# Patient Record
Sex: FEMALE | Race: WHITE
[De-identification: names, ages, dates, MRNs, and addresses within clinical notes are randomized per-mention and may not be internally consistent; named-entity substitution may affect disease eponyms.]

---

## 2017-03-01 ENCOUNTER — HOSPITAL ENCOUNTER (OUTPATIENT)
Dept: HOSPITAL 47 - RADUSMAIN | Age: 37
Discharge: HOME | End: 2017-03-01
Attending: FAMILY MEDICINE
Payer: COMMERCIAL

## 2017-03-01 DIAGNOSIS — R10.11: Primary | ICD-10-CM

## 2017-03-01 PROCEDURE — 76705 ECHO EXAM OF ABDOMEN: CPT

## 2017-03-01 PROCEDURE — 76770 US EXAM ABDO BACK WALL COMP: CPT

## 2017-03-01 NOTE — US
EXAMINATION TYPE: US abd limited kidneys/bladder

 

DATE OF EXAM: 3/1/2017 9:26 AM

 

COMPARISON: 4/30/2016

 

CLINICAL HISTORY: 36-year-old female R10.9 R Flank Pain, R10.11 RUQ Pain.

 

TECHNIQUE: Multiple sonographic images of the right upper quadrant, kidneys and bladder were obtained
.

 

FINDINGS:

Liver Length:  16.1 cm   

Gallbladder Wall:  0.2 cm   

CBD:  0.3 cm

Right Kidney:  11.4 x 6.5 x 6.3  cm 

Left Kidney:  10.6 x 5.5 x 5.5 cm

 

Pancreas:  Tail obscured by overlying bowel gas. Otherwise, grossly unremarkable. 

Liver: Coarsened echotexture may be on a technical basis. No focal lesion seen.

Gallbladder: No abnormal gallbladder distention, wall thickening, pericholecystic fluid, or shadowing
 calculi.

CBD:  Within normal limits.

Right Kidney:  No hydronephrosis

Left Kidney:  No hydronephrosis

Bladder: No gross abnormality of the urine distended bladder. Both ureteral jets are visualized.

 

 

IMPRESSION:

1. Coarsened echotexture of the liver could be on a technical basis or could represent underlying non
specific hepatocellular disease. Correlate with LFTs in patient risk factors.

2. No hydronephrosis. Both ureteral jets are seen.

## 2017-10-14 ENCOUNTER — HOSPITAL ENCOUNTER (OUTPATIENT)
Dept: HOSPITAL 47 - LABPAT | Age: 37
Discharge: HOME | End: 2017-10-14
Payer: COMMERCIAL

## 2017-10-14 DIAGNOSIS — Z01.812: Primary | ICD-10-CM

## 2017-10-14 LAB
ANION GAP SERPL CALC-SCNC: 7 MMOL/L
BUN SERPL-SCNC: 5 MG/DL (ref 7–17)
CALCIUM SPEC-MCNC: 8.6 MG/DL (ref 8.4–10.2)
CELLS COUNTED: 100
CH: 32.6
CHCM: 32.9
CHLORIDE SERPL-SCNC: 105 MMOL/L (ref 98–107)
CO2 SERPL-SCNC: 24 MMOL/L (ref 22–30)
ERYTHROCYTE [DISTWIDTH] IN BLOOD BY AUTOMATED COUNT: 4.36 M/UL (ref 3.8–5.4)
ERYTHROCYTE [DISTWIDTH] IN BLOOD: 13.4 % (ref 11.5–15.5)
GLUCOSE SERPL-MCNC: 88 MG/DL (ref 74–99)
HCT VFR BLD AUTO: 43.5 % (ref 34–46)
HDW: 2.28
HGB BLD-MCNC: 13.9 GM/DL (ref 11.4–16)
MCH RBC QN AUTO: 32 PG (ref 25–35)
MCHC RBC AUTO-ENTMCNC: 32.1 G/DL (ref 31–37)
MCV RBC AUTO: 99.8 FL (ref 80–100)
NON-AFRICAN AMERICAN GFR(MDRD): >60
POTASSIUM SERPL-SCNC: 3.9 MMOL/L (ref 3.5–5.1)
SODIUM SERPL-SCNC: 136 MMOL/L (ref 137–145)
WBC # BLD AUTO: 8.4 K/UL (ref 3.8–10.6)
WBC (PEROX): 8.87

## 2017-10-14 PROCEDURE — 80048 BASIC METABOLIC PNL TOTAL CA: CPT

## 2017-10-14 PROCEDURE — 85025 COMPLETE CBC W/AUTO DIFF WBC: CPT

## 2017-10-14 PROCEDURE — 86850 RBC ANTIBODY SCREEN: CPT

## 2017-10-14 PROCEDURE — 86900 BLOOD TYPING SEROLOGIC ABO: CPT

## 2017-10-14 PROCEDURE — 86901 BLOOD TYPING SEROLOGIC RH(D): CPT

## 2018-01-16 ENCOUNTER — HOSPITAL ENCOUNTER (OUTPATIENT)
Dept: HOSPITAL 47 - RADMAMWWP | Age: 38
Discharge: HOME | End: 2018-01-16
Attending: FAMILY MEDICINE
Payer: COMMERCIAL

## 2018-01-16 DIAGNOSIS — R92.8: ICD-10-CM

## 2018-01-16 DIAGNOSIS — N63.10: Primary | ICD-10-CM

## 2018-01-16 PROCEDURE — 77066 DX MAMMO INCL CAD BI: CPT

## 2018-01-17 NOTE — USB
Reason for exam: additional evaluation requested from abnormal screening.



History:

Patient is nulliparous.

Taking hormonal contraceptives for 4 years.



US Breast Limited LT

Left breast ultrasound demonstrates no cystic or solid lesion seen. A 6 month 

follow up left mammogram recommended.



These results were verbally communicated with the patient and result sheet given 

to the patient on 1/16/18.





ASSESSMENT: Probably benign, BI-RAD 3



RECOMMENDATION:

Follow-up diagnostic mammogram of the left breast in 6 months.

## 2018-01-17 NOTE — MM
Reason for exam: clinical finding.

Last mammogram was performed 3 years and 3 months ago.



History:

Patient is nulliparous.

Taking hormonal contraceptives for 4 years.

Indicated problem(s): lump or thickening in the right breast.



Physical Findings:

Nurse did not find any significant physical abnormalities on exam.



MG Diagnostic Mammo w CAD [facility]

Bilateral CC and MLO view(s) were taken.  ML and spot compression MLO view(s) were

taken of the left breast.

Prior study comparison: October 24, 2014, bilateral MG screening mammo w CAD.

The breast tissue is heterogeneously dense. This may lower the sensitivity of 

mammography.  Nodular density upper outer quadrant 8.7cm from nipple noted. 

Ultrasound is recommended.



These results were verbally communicated with the patient and result sheet given 

to the patient on 1/16/18.





ASSESSMENT: Incomplete: need additional imaging evaluation, BI-RAD 0



RECOMMENDATION:

Ultrasound of the left breast.

## 2018-04-20 ENCOUNTER — HOSPITAL ENCOUNTER (OUTPATIENT)
Dept: HOSPITAL 47 - RADUSWWP | Age: 38
End: 2018-04-20
Payer: COMMERCIAL

## 2018-04-20 DIAGNOSIS — N83.291: Primary | ICD-10-CM

## 2018-04-20 PROCEDURE — 76856 US EXAM PELVIC COMPLETE: CPT

## 2018-04-20 NOTE — US
EXAMINATION TYPE: US pelvic complete

 

DATE OF EXAM: 4/20/2018

 

COMPARISON: US December 13, 2015 CLINICAL HISTORY: R10.2 pelvic pain. Intermittent pelvic pain x coup
le weeks, occasional bleeding since hysterectomy 6 months ago

 

TECHNIQUE:  Transabdominal (TA).  Transabdominal sonographic images of the pelvis were acquired.  

 

Date of LMP:  2017

 

EXAM MEASUREMENTS:

 

Uterus:  surgically absent 

Endometrial Stripe: surgically absent 

Right Ovary:  3.1 x 2.5 x 2.9 cm

Left Ovary:  3.0 x 1.5 x 2.7 cm

 

 

 

1. Uterus:  surgically absent

2. Endometrium:  surgically absent

3. Right Ovary:  1.4 x 1.4 x 1.5cm hypoechoic area

4. Left Ovary:  wnl

5. Bilateral Adnexa:  wnl

6. Posterior cul-de-sac:  wnl

 

There has been interval hysterectomy. There is 1.5 cm round anechoic lesion within right ovary likely
 reflecting simple small ovarian cyst

 

IMPRESSION: A 1.5 cm simple small right ovarian cyst is present otherwise unremarkable study.

## 2018-09-25 ENCOUNTER — HOSPITAL ENCOUNTER (OUTPATIENT)
Dept: HOSPITAL 47 - RADMAMWWP | Age: 38
Discharge: HOME | End: 2018-09-25
Attending: FAMILY MEDICINE
Payer: COMMERCIAL

## 2018-09-25 DIAGNOSIS — N63.21: Primary | ICD-10-CM

## 2018-09-25 PROCEDURE — 77065 DX MAMMO INCL CAD UNI: CPT

## 2018-09-26 NOTE — MM
Reason for exam: follow-up at short interval from prior study.

Last mammogram was performed 8 months ago.



History:

Patient is nulliparous.

Taking hormonal contraceptives for 4 years.



Physical Findings:

Nurse did not find any significant physical abnormalities on exam.



MG Diagnostic Mammo LT w CAD

CC and MLO view(s) were taken of the left breast.

Prior study comparison: January 16, 2018, bilateral MG diagnostic mammo w CAD [facility].

October 24, 2014, bilateral MG screening mammo w CAD.

The breast tissue is heterogeneously dense. This may lower the sensitivity of 

mammography.  There are benign appearing round calcifications in the left breast. 

Asymmetric breast tissue, stable, left posterior upper tissue. There is no 

discrete abnormality.



These results were verbally communicated with the patient and result sheet given 

to the patient on 9/25/18.





ASSESSMENT: Benign, BI-RAD 2



RECOMMENDATION:

Routine screening mammogram of both breasts in 4 months.

Back on schedule for January 2019.

## 2018-11-11 ENCOUNTER — HOSPITAL ENCOUNTER (EMERGENCY)
Dept: HOSPITAL 47 - EC | Age: 38
Discharge: HOME | End: 2018-11-11
Payer: COMMERCIAL

## 2018-11-11 VITALS
DIASTOLIC BLOOD PRESSURE: 77 MMHG | TEMPERATURE: 98.2 F | SYSTOLIC BLOOD PRESSURE: 132 MMHG | RESPIRATION RATE: 16 BRPM | HEART RATE: 74 BPM
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
## 2018-11-11 DIAGNOSIS — Z88.1: ICD-10-CM
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
## 2018-11-11 DIAGNOSIS — S90.31XA: Primary | ICD-10-CM

## 2018-11-11 DIAGNOSIS — Z79.899: ICD-10-CM

## 2018-11-11 PROCEDURE — 96372 THER/PROPH/DIAG INJ SC/IM: CPT

## 2018-11-11 PROCEDURE — 73630 X-RAY EXAM OF FOOT: CPT

## 2018-11-11 PROCEDURE — 99283 EMERGENCY DEPT VISIT LOW MDM: CPT

## 2018-11-11 NOTE — XR
EXAMINATION TYPE: XR foot complete RT

 

DATE OF EXAM: 11/11/2018

 

COMPARISON: NONE

 

HISTORY: Right foot pain

 

TECHNIQUE: 3 views

 

FINDINGS: Metatarsals are intact. I see no fracture nor dislocation. There is plantar calcaneal spurr
ing. There are no erosions.

 

IMPRESSION: Calcaneal spurring. No fracture.

## 2018-11-11 NOTE — ED
Lower Extremity Injury HPI





- General


Source: patient


Mode of arrival: ambulatory


Limitations: no limitations





<Nicole Barrios - Last Filed: 11/11/18 22:54>





<Edith Celaya - Last Filed: 11/12/18 00:04>





- General


Chief Complaint: Extremity Injury, Lower


Stated Complaint: FOOT INJURY


Time Seen by Provider: 11/11/18 22:07





- History of Present Illness


Initial Comments: 


39yo female with PMH of previous gastric bypass presenting today for cc of 

right foot pain. At around 9:35PM this evening pt was using laptop when it fell 

onto the top of her right foot. Pt stated that it immediately began to swell 

and bruise. pt admitted to pain with ambulation/weight bearing. Pt denies 

numbness, tingling, loss of sensation, inability to move toes, injury to ankle 

or any other extremity, pt denies falling. Upon arrival pt appears well, she is 

limping. Pt VS within acceptable limits. Remainder of ROS (-), patient denies 

any recent fever, chills, shortness of breath, chest pain, back pain, abdominal 

pain, nausea or vomiting, numbness or tingling, dysuria or hematuria, 

constipation or diarrhea, headaches or visual changes, or any other complaints.


 (Nicole Barrios)





- Related Data


 Home Medications











 Medication  Instructions  Recorded  Confirmed


 


Multivitamins, Thera [Multivitamin] 1 tab PO DAILY 12/30/14 10/24/17


 


ALPRAZolam [Xanax] 0.25 mg PO DAILY PRN 10/19/17 10/24/17


 


Biotin 5,000 mcg PO DAILY 10/19/17 10/24/17


 


Cholecalciferol (Vitamin D3) 2,000 unit PO DAILY 10/19/17 10/24/17





[Vitamin D3]   


 


Melatonin 5 mg PO HS 10/19/17 10/24/17


 


Vitamin A (Unknown Dose) 1 tab PO DAILY 10/19/17 10/24/17








 Previous Rx's











 Medication  Instructions  Recorded


 


Acetaminophen-Codeine 300-30mg 1 tab PO Q4H PRN #30 tablet 10/24/17





[Tylenol #3]  


 


Ibuprofen [Motrin] 600 mg PO Q6HR PRN #30 tab 10/24/17











 Allergies











Allergy/AdvReac Type Severity Reaction Status Date / Time


 


clindamycin HCl Allergy  Anaphylaxis Verified 11/11/18 22:06





[From Cleocin]     


 


clindamycin palmitate HCl Allergy  Anaphylaxis Verified 11/11/18 22:06





[From Cleocin]     


 


clindamycin phosphate Allergy  Anaphylaxis Verified 11/11/18 22:06





[From Cleocin]     














Review of Systems


ROS Other: All systems not noted in ROS Statement are negative.


Constitutional: Denies: fever, chills


Eyes: Denies: eye pain


ENT: Denies: ear pain, throat pain


Respiratory: Denies: cough, dyspnea, wheezes, hemoptysis, stridor


Cardiovascular: Denies: chest pain, palpitations


Gastrointestinal: Denies: abdominal pain, nausea, vomiting, diarrhea, 

constipation, hematemesis, melena


Genitourinary: Denies: urgency, dysuria, frequency, hematuria


Musculoskeletal: Reports: other (pain lateral aspect of right foot).  Denies: 

back pain


Skin: Reports: as per HPI, change in color (bruising, denies any pallor).  

Denies: rash, lesions


Neurological: Denies: headache, weakness, numbness, paresthesias, confusion





<Nicole Barrios L - Last Filed: 11/11/18 22:54>


ROS Other: All systems not noted in ROS Statement are negative.





<Edith Celaya P - Last Filed: 11/12/18 00:04>


ROS Statement: 


Those systems with pertinent positive or pertinent negative responses have been 

documented in the HPI.








Past Medical History


Past Medical History: No Reported History


Additional Past Medical History / Comment(s): PT STATES HX OF FIBROIDS


History of Any Multi-Drug Resistant Organisms: None Reported


Past Surgical History: Bariatric Surgery, Tonsillectomy, Tubal Ligation, 

Uterine Ablation


Additional Past Surgical History / Comment(s): GASTRIC SLEEVE, BENIGN LYMPH 

NODES GROIN REMOVED, EXPLORATORY ABDOMINAL SX, D&C X2 left knee arthroscopy,


Past Anesthesia/Blood Transfusion Reactions: No Reported Reaction


Past Psychological History: Anxiety


Smoking Status: Never smoker


Past Alcohol Use History: Occasional


Past Drug Use History: None Reported





- Past Family History


  ** Mother


Family Medical History: Deep Vein Thrombosis (DVT)





<Nicole Barrios - Last Filed: 11/11/18 22:54>





General Exam


Limitations: no limitations





<Nicole Barrios - Last Filed: 11/11/18 22:54>





<Edith Celaya P - Last Filed: 11/12/18 00:04>





- General Exam Comments


Initial Comments: 


General:  The patient is awake and alert, in no distress, and does not appear 

acutely ill. 


Eye:  Pupils are equal, round and reactive to light, extra-ocular movements are 

intact.  No nystagmus.  There is normal conjunctiva bilaterally.  No signs of 

icterus.  


Ears, nose, mouth and throat:  There are moist mucous membranes and no oral 

lesions. 


Neck:  The neck is supple, there is no tenderness or JVD.  


Cardiovascular:  There is a regular rate and rhythm. No murmur, rub or gallop 

is appreciated.


Respiratory:  Lungs are clear to auscultation, respirations are non-labored, 

breath sounds are equal.  No wheezes, stridor, rales, or rhonchi.


Musculoskeletal:  Upon inspection there is bruising near base of 5th metatarsal

, no bruising or pain of the midfoot or forefoot. Pt admits to tenderness to 

palpation along length of fifth digit/metarsal. Pt is able to wiggle all five 

digits of the feet b/l equally.  Strength 5/5 at the ankle equal b/l. Sensation 

intact. DP pulses equal bilaterally 2+. Compartments are soft and compressible. 


Neurological:  A&O x 3. CN II-XII intact, There are no obvious motor or sensory 

deficits. Coordination appears grossly intact. Speech is normal.


Skin:  Skin is warm and dry and no rashes or lesions are noted. 


Psychiatric:  Cooperative, appropriate mood & affect, normal judgment.  


 (Nicole Barrios)





 Vital Signs











  11/11/18 11/11/18





  22:04 22:43


 


Temperature 98.1 F 98.2 F


 


Pulse Rate 77 74


 


Respiratory 18 16





Rate  


 


Blood Pressure 119/68 132/77


 


O2 Sat by Pulse 99 97





Oximetry  














Medical Decision Making





<Nicole Barrios L - Last Filed: 11/11/18 22:54>





<Edith Celaya P - Last Filed: 11/12/18 00:04>





- Medical Decision Making


XR (-), no concern for lisfranc at this time, no mid/forefoot pain or bruising. 

Pt neurovascularly intact, compartments soft and compressible. Pt was 

instructed to repeat XR in 5 days if symptoms persist and to ambulate using 

crutches for next 2-3 days. In addition pt was instructed to f/u with primary 

care within 48 hours. Ice was applied to the area, pt given toradol 30mg IM, 

she states she previously has tolerated reguardless of her bypass surgery. Pt 

placed in ACE bandage, pt states she has crutches for ambulation at home. Pt 

given work note for next 2 days. At this time after discussing all results with 

both patient and attending Dr. Celaya. I feel pt is stable for discharge.  To 

parameters discussed at length with patient who verbalized understanding.  All 

findings were discussed with patient from radiographic imaging.  Patient was 

discharged in stable condition, patient agreeable with discharge.


 (Nicole Barrios)


I was available for consultation in the emergency department. The history and 

physical exam were done by the midlevel provider.  I was consulted for this 

patient's care.  I reviewed the case with the midlevel provider and based on 

their presentation of the patient, I agree with the assessment, medical 

decision making and plan of care as documented.


 (Edith Celaya)





Disposition


Is patient prescribed a controlled substance at d/c from ED?: No


Time of Disposition: 22:32





<Nicole Barrios - Last Filed: 11/11/18 22:54>





<Edith Celaya - Last Filed: 11/12/18 00:04>


Clinical Impression: 


 Contusion of right foot





Disposition: HOME SELF-CARE


Condition: Good


Instructions:  Foot Contusion (ED)


Additional Instructions: 


Please use over the counter pain medication as discussed.  Please follow-up 

with family doctor in the next 2 days, if symptoms persist >5 days I would get 

repeat XRAY. Please use crutches for ambulation for next 2-3 days.  Please 

return to emergency room if the symptoms increase or worsen or for any other 

concerns.


Referrals: 


Grupo Ovalle MD [Primary Care Provider] - 1-2 days

## 2021-03-09 ENCOUNTER — HOSPITAL ENCOUNTER (OUTPATIENT)
Dept: HOSPITAL 47 - RADMAMWWP | Age: 41
End: 2021-03-09
Attending: FAMILY MEDICINE
Payer: COMMERCIAL

## 2021-03-09 DIAGNOSIS — Z12.31: Primary | ICD-10-CM

## 2021-03-09 PROCEDURE — 77067 SCR MAMMO BI INCL CAD: CPT

## 2021-03-09 PROCEDURE — 77063 BREAST TOMOSYNTHESIS BI: CPT

## 2021-03-11 NOTE — MM
Reason for exam: screening  (asymptomatic).

Last mammogram was performed 2 years and 5 months ago.



History:

Patient is nulliparous.

Took hormonal contraceptives for 4 years.



Physical Findings:

A clinical breast exam by your physician is recommended on an annual basis and 

results should be correlated with mammographic findings.



MG 3D Screening Mammo W/Cad

Bilateral CC and MLO view(s) were taken.

Prior study comparison: January 16, 2018, bilateral MG diagnostic mammo w CAD [facility].

October 24, 2014, bilateral MG screening mammo w CAD.

The breast tissue is heterogeneously dense. This may lower the sensitivity of 

mammography.  10 o'clock right focal asymmetry is more defined and incompletely 

disperses on 3D images.





ASSESSMENT: Incomplete: need additional imaging evaluation, BI-RAD 0



RECOMMENDATION:

Special view mammogram of the right breast.

(3D)



If lesion persists on supplemental views, image directed ultrasound is 

recommended.



[facility] will attempt to contact patient to return for supplemental 

views and ultrasound if indicated.

## 2021-03-19 ENCOUNTER — HOSPITAL ENCOUNTER (OUTPATIENT)
Dept: HOSPITAL 47 - RADMAMWWP | Age: 41
Discharge: HOME | End: 2021-03-19
Attending: FAMILY MEDICINE
Payer: COMMERCIAL

## 2021-03-19 DIAGNOSIS — R92.2: Primary | ICD-10-CM

## 2021-03-19 PROCEDURE — 77061 BREAST TOMOSYNTHESIS UNI: CPT

## 2021-03-19 PROCEDURE — 77065 DX MAMMO INCL CAD UNI: CPT

## 2021-03-22 NOTE — MM
Reason for exam: additional evaluation requested from abnormal screening.

Last mammogram was performed less than 1 month ago.



History:

Patient is nulliparous.

Took hormonal contraceptives for 4 years.



Physical Findings:

Nurse did not find any significant physical abnormalities on exam.



MG 3D Work Up W/Cad RT

Spot compression CC, spot compression MLO, and LM view(s) were taken of the right 

breast.

Prior study comparison: March 9, 2021, bilateral MG 3d screening mammo w/cad.  

September 25, 2018, left breast MG diagnostic mammo LT w CAD.

The breast tissue is heterogeneously dense. This may lower the sensitivity of 

mammography.  No distinct lesion persists on additional views.



These results were verbally communicated with the patient and result sheet given 

to the patient on 3/19/21.





ASSESSMENT: Benign, BI-RAD 2



RECOMMENDATION:

Return to routine screening mammogram schedule for both breasts.

## 2022-03-19 ENCOUNTER — HOSPITAL ENCOUNTER (OUTPATIENT)
Dept: HOSPITAL 47 - LABWHC1 | Age: 42
Discharge: HOME | End: 2022-03-19
Attending: FAMILY MEDICINE
Payer: COMMERCIAL

## 2022-03-19 DIAGNOSIS — Z13.1: ICD-10-CM

## 2022-03-19 DIAGNOSIS — R53.83: ICD-10-CM

## 2022-03-19 DIAGNOSIS — Z13.220: Primary | ICD-10-CM

## 2022-03-19 LAB
ALBUMIN SERPL-MCNC: 4.1 G/DL (ref 3.8–4.9)
ALBUMIN/GLOB SERPL: 1.72 G/DL (ref 1.6–3.17)
ALP SERPL-CCNC: 106 U/L (ref 41–126)
ALT SERPL-CCNC: 14 U/L (ref 8–44)
ANION GAP SERPL CALC-SCNC: 9.8 MMOL/L (ref 10–18)
AST SERPL-CCNC: 14 U/L (ref 13–35)
BASOPHILS # BLD AUTO: 0.06 X 10*3/UL (ref 0–0.1)
BASOPHILS NFR BLD AUTO: 0.8 %
BUN SERPL-SCNC: 6 MG/DL (ref 9–27)
BUN/CREAT SERPL: 9.44 RATIO (ref 12–20)
CALCIUM SPEC-MCNC: 9 MG/DL (ref 8.7–10.3)
CHLORIDE SERPL-SCNC: 104 MMOL/L (ref 96–109)
CHOLEST SERPL-MCNC: 188 MG/DL (ref 0–200)
CO2 SERPL-SCNC: 23.5 MMOL/L (ref 20–27.5)
EOSINOPHIL # BLD AUTO: 0.45 X 10*3/UL (ref 0.04–0.35)
EOSINOPHIL NFR BLD AUTO: 6.2 %
ERYTHROCYTE [DISTWIDTH] IN BLOOD BY AUTOMATED COUNT: 4.42 X 10*6/UL (ref 4.1–5.2)
ERYTHROCYTE [DISTWIDTH] IN BLOOD: 12.5 % (ref 11.5–14.5)
GLOBULIN SER CALC-MCNC: 2.4 G/DL (ref 1.6–3.3)
GLUCOSE SERPL-MCNC: 95 MG/DL (ref 70–110)
HCT VFR BLD AUTO: 43.7 % (ref 37.2–46.3)
HDLC SERPL-MCNC: 39.6 MG/DL (ref 40–60)
HGB BLD-MCNC: 14.1 G/DL (ref 12–15)
IMM GRANULOCYTES BLD QL AUTO: 0.1 %
LDLC SERPL CALC-MCNC: 119.2 MG/DL (ref 0–131)
LYMPHOCYTES # SPEC AUTO: 2.62 X 10*3/UL (ref 0.9–5)
LYMPHOCYTES NFR SPEC AUTO: 36.2 %
MCH RBC QN AUTO: 31.9 PG (ref 27–32)
MCHC RBC AUTO-ENTMCNC: 32.3 G/DL (ref 32–37)
MCV RBC AUTO: 98.9 FL (ref 80–97)
MONOCYTES # BLD AUTO: 0.45 X 10*3/UL (ref 0.2–1)
MONOCYTES NFR BLD AUTO: 6.2 %
NEUTROPHILS # BLD AUTO: 3.64 X 10*3/UL (ref 1.8–7.7)
NEUTROPHILS NFR BLD AUTO: 50.5 %
NRBC BLD AUTO-RTO: 0 /100 WBCS (ref 0–0)
PLATELET # BLD AUTO: 256 X 10*3/UL (ref 140–440)
POTASSIUM SERPL-SCNC: 4.1 MMOL/L (ref 3.5–5.5)
PROT SERPL-MCNC: 6.5 G/DL (ref 6.2–8.2)
SODIUM SERPL-SCNC: 137 MMOL/L (ref 135–145)
T4 FREE SERPL-MCNC: 1.13 NG/DL (ref 0.8–1.8)
TRIGL SERPL-MCNC: 146 MG/DL (ref 0–149)
VLDLC SERPL CALC-MCNC: 29.2 MG/DL (ref 5–40)
WBC # BLD AUTO: 7.23 X 10*3/UL (ref 4.5–10)

## 2022-03-19 PROCEDURE — 82306 VITAMIN D 25 HYDROXY: CPT

## 2022-03-19 PROCEDURE — 84439 ASSAY OF FREE THYROXINE: CPT

## 2022-03-19 PROCEDURE — 84443 ASSAY THYROID STIM HORMONE: CPT

## 2022-03-19 PROCEDURE — 85025 COMPLETE CBC W/AUTO DIFF WBC: CPT

## 2022-03-19 PROCEDURE — 80053 COMPREHEN METABOLIC PANEL: CPT

## 2022-03-19 PROCEDURE — 84481 FREE ASSAY (FT-3): CPT

## 2022-03-19 PROCEDURE — 84630 ASSAY OF ZINC: CPT

## 2022-03-19 PROCEDURE — 36415 COLL VENOUS BLD VENIPUNCTURE: CPT

## 2022-03-19 PROCEDURE — 80061 LIPID PANEL: CPT

## 2022-03-19 PROCEDURE — 86140 C-REACTIVE PROTEIN: CPT

## 2022-09-21 ENCOUNTER — HOSPITAL ENCOUNTER (OUTPATIENT)
Dept: HOSPITAL 47 - RADMAMWWP | Age: 42
Discharge: HOME | End: 2022-09-21
Attending: FAMILY MEDICINE
Payer: COMMERCIAL

## 2022-09-21 DIAGNOSIS — Z12.31: Primary | ICD-10-CM

## 2022-09-21 PROCEDURE — 77063 BREAST TOMOSYNTHESIS BI: CPT

## 2022-09-21 PROCEDURE — 77067 SCR MAMMO BI INCL CAD: CPT

## 2022-09-22 NOTE — MM
Reason for Exam: Screening  (asymptomatic). 

Last mammogram was performed 1 year(s) and 6 month(s) ago. 





Patient History: 

Menarche at age 11. Patient has no children. Hysterectomy at age 37. Patient used Hormonal

Contraceptives for 4 years. 





Risk Values: 

Milagros 5 year model risk: 0.9%.

NCI Lifetime model risk: 10.7%.





Prior Study Comparison: 

9/25/2018 Left Diagnostic Mammogram, [facility]. 3/9/2021 Bilateral Screening Mammogram, [facility]. 3/19/2021

Right Diagnostic Mammogram, [facility]. 





Tissue Density: 

The breast tissue is heterogeneously dense. This may lower the sensitivity of mammography.





Findings: 

Analyzed By CAD. 

There is no suspicious group of microcalcifications or new suspicious mass in either breast. 





Overall Assessment: Negative, BI-RAD 1





Management: 

Screening Mammogram of both breasts in 1 year.

A clinical breast exam by your physician is recommended on an annual basis and results should be

correlated with mammographic findings.  [facility] will attempt to contact patient to

return for supplemental views and ultrasound if indicated.



Electronically signed and approved by: Deon Perrin DO

## 2023-05-20 ENCOUNTER — HOSPITAL ENCOUNTER (OUTPATIENT)
Dept: HOSPITAL 47 - LABWHC1 | Age: 43
Discharge: HOME | End: 2023-05-20
Attending: FAMILY MEDICINE
Payer: COMMERCIAL

## 2023-05-20 DIAGNOSIS — Z13.220: ICD-10-CM

## 2023-05-20 DIAGNOSIS — R53.83: ICD-10-CM

## 2023-05-20 DIAGNOSIS — Z13.1: Primary | ICD-10-CM

## 2023-05-20 PROCEDURE — 86140 C-REACTIVE PROTEIN: CPT

## 2023-05-20 PROCEDURE — 84443 ASSAY THYROID STIM HORMONE: CPT

## 2023-05-20 PROCEDURE — 80053 COMPREHEN METABOLIC PANEL: CPT

## 2023-05-20 PROCEDURE — 36415 COLL VENOUS BLD VENIPUNCTURE: CPT

## 2023-05-20 PROCEDURE — 84481 FREE ASSAY (FT-3): CPT

## 2023-05-20 PROCEDURE — 80061 LIPID PANEL: CPT

## 2023-05-20 PROCEDURE — 83036 HEMOGLOBIN GLYCOSYLATED A1C: CPT

## 2023-05-20 PROCEDURE — 82306 VITAMIN D 25 HYDROXY: CPT

## 2023-05-20 PROCEDURE — 85025 COMPLETE CBC W/AUTO DIFF WBC: CPT

## 2023-05-20 PROCEDURE — 84439 ASSAY OF FREE THYROXINE: CPT

## 2023-05-21 LAB
ALBUMIN SERPL-MCNC: 3.8 G/DL (ref 3.8–4.9)
ALBUMIN/GLOB SERPL: 1.68 G/DL (ref 1.6–3.17)
ALP SERPL-CCNC: 99 U/L (ref 41–126)
ALT SERPL-CCNC: 13 U/L (ref 8–44)
ANION GAP SERPL CALC-SCNC: 10.4 MMOL/L (ref 10–18)
AST SERPL-CCNC: 15 U/L (ref 13–35)
BASOPHILS # BLD AUTO: 0.04 X 10*3/UL (ref 0–0.1)
BASOPHILS NFR BLD AUTO: 0.6 %
BUN SERPL-SCNC: 8 MG/DL (ref 9–27)
BUN/CREAT SERPL: 12.22 RATIO (ref 12–20)
CALCIUM SPEC-MCNC: 9.2 MG/DL (ref 8.7–10.3)
CHLORIDE SERPL-SCNC: 106 MMOL/L (ref 96–109)
CHOLEST SERPL-MCNC: 201 MG/DL (ref 0–200)
CO2 SERPL-SCNC: 24.3 MMOL/L (ref 20–27.5)
EOSINOPHIL # BLD AUTO: 0.27 X 10*3/UL (ref 0.04–0.35)
EOSINOPHIL NFR BLD AUTO: 4.4 %
ERYTHROCYTE [DISTWIDTH] IN BLOOD BY AUTOMATED COUNT: 4.37 X 10*6/UL (ref 4.1–5.2)
ERYTHROCYTE [DISTWIDTH] IN BLOOD: 12.3 % (ref 11.5–14.5)
GLOBULIN SER CALC-MCNC: 2.3 G/DL (ref 1.6–3.3)
GLUCOSE SERPL-MCNC: 99 MG/DL (ref 70–110)
HCT VFR BLD AUTO: 43.3 % (ref 37.2–46.3)
HDLC SERPL-MCNC: 40.6 MG/DL (ref 40–60)
HGB BLD-MCNC: 14 G/DL (ref 12–15)
IMM GRANULOCYTES BLD QL AUTO: 0.2 %
LDLC SERPL CALC-MCNC: 129.8 MG/DL (ref 0–131)
LYMPHOCYTES # SPEC AUTO: 2.25 X 10*3/UL (ref 0.9–5)
LYMPHOCYTES NFR SPEC AUTO: 36.3 %
MCH RBC QN AUTO: 32 PG (ref 27–32)
MCHC RBC AUTO-ENTMCNC: 32.3 G/DL (ref 32–37)
MCV RBC AUTO: 99.1 FL (ref 80–97)
MONOCYTES # BLD AUTO: 0.44 X 10*3/UL (ref 0.2–1)
MONOCYTES NFR BLD AUTO: 7.1 %
NEUTROPHILS # BLD AUTO: 3.18 X 10*3/UL (ref 1.8–7.7)
NEUTROPHILS NFR BLD AUTO: 51.4 %
NRBC BLD AUTO-RTO: 0 /100 WBCS (ref 0–0)
PLATELET # BLD AUTO: 281 X 10*3/UL (ref 140–440)
POTASSIUM SERPL-SCNC: 4.4 MMOL/L (ref 3.5–5.5)
PROT SERPL-MCNC: 6.1 G/DL (ref 6.2–8.2)
SODIUM SERPL-SCNC: 141 MMOL/L (ref 135–145)
T4 FREE SERPL-MCNC: 1.26 NG/DL (ref 0.8–1.8)
TRIGL SERPL-MCNC: 153 MG/DL (ref 0–149)
VLDLC SERPL CALC-MCNC: 30.6 MG/DL (ref 5–40)
WBC # BLD AUTO: 6.19 X 10*3/UL (ref 4.5–10)

## 2023-10-04 ENCOUNTER — HOSPITAL ENCOUNTER (OUTPATIENT)
Dept: HOSPITAL 47 - RADMAMWWP | Age: 43
Discharge: HOME | End: 2023-10-04
Attending: FAMILY MEDICINE
Payer: COMMERCIAL

## 2023-10-04 DIAGNOSIS — Z12.31: Primary | ICD-10-CM

## 2023-10-04 PROCEDURE — 77067 SCR MAMMO BI INCL CAD: CPT

## 2023-10-04 PROCEDURE — 77063 BREAST TOMOSYNTHESIS BI: CPT

## 2023-10-05 NOTE — MM
Reason for Exam: Screening  (asymptomatic). 

Last mammogram was performed 1 year(s) and 1 month(s) ago. 





Patient History: 

Menarche at age 11. Patient has no children. Hysterectomy at age 37. Patient used Hormonal

Contraceptives for 4 years. 





Risk Values: 

Milagros 5 year model risk: 0.9%.

NCI Lifetime model risk: 10.6%.





Prior Study Comparison: 

3/9/2021 Bilateral Screening Mammogram, [facility]. 3/19/2021 Right Diagnostic Mammogram, [facility]. 9/21/2022

Bilateral MG 3D screening mammo w/cad, [facility]. 





Tissue Density: 

The breast tissue is heterogeneously dense. This may lower the sensitivity of mammography.





Findings: 

Analyzed By CAD. 

There is no suspicious group of microcalcifications or new suspicious mass in either breast. 





Overall Assessment: Negative, BI-RAD 1





Management: 

Screening Mammogram of both breasts in 1 year.

.



Patient should continue monthly self-breast exams.  A clinical breast exam by your physician is

recommended on an annual basis.

This exam should not preclude additional follow-up of suspicious palpable abnormalities.



Note on Milagros scores and lifetime risk:

1. A Milagros score greater than 3% is considered moderate risk. If this is the case, consider

specialist referral to assess eligibility for a risk reducing agent.

2. If overall lifetime risk for the development of breast cancer is 20% or higher, the patient may

qualify for future screening with alternating mammogram and breast MRI.



Electronically signed and approved by: Leopold M. Fregoli, M.D. Radiologis

## 2025-03-07 ENCOUNTER — HOSPITAL ENCOUNTER (OUTPATIENT)
Dept: HOSPITAL 47 - ORWHC2ENDO | Age: 45
Discharge: HOME | End: 2025-03-07
Attending: INTERNAL MEDICINE
Payer: COMMERCIAL

## 2025-03-07 VITALS — RESPIRATION RATE: 16 BRPM | TEMPERATURE: 97.7 F

## 2025-03-07 VITALS — BODY MASS INDEX: 40.3 KG/M2

## 2025-03-07 VITALS — DIASTOLIC BLOOD PRESSURE: 63 MMHG | HEART RATE: 70 BPM | SYSTOLIC BLOOD PRESSURE: 110 MMHG

## 2025-03-07 DIAGNOSIS — Z90.710: ICD-10-CM

## 2025-03-07 DIAGNOSIS — Z98.890: ICD-10-CM

## 2025-03-07 DIAGNOSIS — Z12.11: Primary | ICD-10-CM

## 2025-03-07 DIAGNOSIS — F41.9: ICD-10-CM

## 2025-03-07 DIAGNOSIS — Z88.1: ICD-10-CM

## 2025-03-07 DIAGNOSIS — F32.A: ICD-10-CM

## 2025-03-07 DIAGNOSIS — Z98.84: ICD-10-CM

## 2025-03-07 PROCEDURE — 45378 DIAGNOSTIC COLONOSCOPY: CPT

## 2025-03-07 RX ADMIN — POTASSIUM CHLORIDE ONE MLS: 14.9 INJECTION, SOLUTION INTRAVENOUS at 13:38

## 2025-03-07 NOTE — P.PCN
Date of Procedure: 03/07/25


Procedure(s) Performed: 


BRIEF HISTORY: Patient is a 44-year-old pleasant white female scheduled for an 

elective colonoscopy as a part of screening for cancer.





PROCEDURE PERFORMED: Colonoscopy. 





PREOPERATIVE DIAGNOSIS: Screening for colon cancer. 





IV sedation per Anesthesia. 





PROCEDURE: After informed consent was obtained, the patient, was brought into 

the endoscopy unit. IV sedation was administered by Anesthesia under continuous 

monitoring.  Digital rectal examination was normal. Initially the Olympus CF-160

flexible video colonoscope was then inserted in the rectum, gradually advanced 

into the cecum without any difficulty. Careful examination was performed as the 

scope was gradually being withdrawn. Ileocecal valve and the appendiceal orifice

were visualized and appeared normal.  Prep was excellent. Mucosa of the cecum, 

ascending colon, transverse colon, descending colon, sigmoid colon, and rectum 

appeared normal. Retroflexion was performed in the rectum and no lesions were 

seen. The patient tolerated the procedure well. 





IMPRESSION: Normal-appearing colon from rectum to cecum with no evidence of 

colorectal neoplasia .





RECOMMENDATIONS:  Findings of this examination were discussed with the patient 

as well as her family.  She was advised to have repeat screening colonoscopy in 

10 years..

## 2025-04-03 ENCOUNTER — HOSPITAL ENCOUNTER (OUTPATIENT)
Dept: HOSPITAL 47 - RADMAMWWP | Age: 45
Discharge: HOME | End: 2025-04-03
Attending: FAMILY MEDICINE
Payer: COMMERCIAL

## 2025-04-03 DIAGNOSIS — Z92.0: ICD-10-CM

## 2025-04-03 DIAGNOSIS — Z12.31: Primary | ICD-10-CM

## 2025-04-03 DIAGNOSIS — R92.333: ICD-10-CM

## 2025-04-03 PROCEDURE — 77063 BREAST TOMOSYNTHESIS BI: CPT

## 2025-04-03 PROCEDURE — 77067 SCR MAMMO BI INCL CAD: CPT
